# Patient Record
Sex: MALE | Race: WHITE
[De-identification: names, ages, dates, MRNs, and addresses within clinical notes are randomized per-mention and may not be internally consistent; named-entity substitution may affect disease eponyms.]

---

## 2020-07-20 ENCOUNTER — HOSPITAL ENCOUNTER (EMERGENCY)
Dept: HOSPITAL 76 - ED | Age: 20
Discharge: HOME | End: 2020-07-20
Payer: COMMERCIAL

## 2020-07-20 VITALS — SYSTOLIC BLOOD PRESSURE: 117 MMHG | DIASTOLIC BLOOD PRESSURE: 70 MMHG

## 2020-07-20 DIAGNOSIS — T17.220A: Primary | ICD-10-CM
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
## 2020-07-20 PROCEDURE — 99283 EMERGENCY DEPT VISIT LOW MDM: CPT

## 2020-07-20 PROCEDURE — 71045 X-RAY EXAM CHEST 1 VIEW: CPT

## 2020-07-20 PROCEDURE — 99284 EMERGENCY DEPT VISIT MOD MDM: CPT

## 2020-07-20 NOTE — ED PHYSICIAN DOCUMENTATION
PD HPI HEENT





- Stated complaint


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





- Chief complaint


Chief Complaint: Heent





- History obtained from


History obtained from: Patient





- History of Present Illness


Timing - onset: How many hours ago (1)


Timing - duration: Minutes


Timing - details: Abrupt onset


Location: Throat (He was sucking on a small hard candy about the size of a dime 

or nickel and felt that it gets stuck in his throat.  He states he abruptly 

could not breathe or talk and felt that he was choking for about 20 or 30 

seconds.  He then was able to take it in bit of a breath in and felt the candy 

go down.), Other (He continues with some soreness in the throat but was having 

normal breathing and voice.  He had not tried to swallow any fluids since the 

event.  He was brought here by his significant other directly after the event.  

He felt coughing for a few minutes afterwards but not now)


Worsens: Swalllowing


Associated symptoms: No: Congestion, Rhinorrhea, Swollen nodes, Facial swelling,

Cough


Similar symptoms before: Has not had sx before





Review of Systems


Cardiac: denies: Chest pain / pressure


Respiratory: denies: Dyspnea, Wheezing


GI: denies: Nausea, Vomiting


Neurologic: denies: Near syncope, Syncope, Altered mental status





PD PAST MEDICAL HISTORY





- Past Medical History


Past Medical History: No


Cardiovascular: None


Respiratory: None





- Allergies


Allergies/Adverse Reactions: 


                                    Allergies











Allergy/AdvReac Type Severity Reaction Status Date / Time


 


No Known Drug Allergies Allergy   Verified 07/20/20 21:43














PD ED PE NORMAL





- Vitals


Vital signs reviewed: Yes





- General


General: Alert and oriented X 3, No acute distress, Well developed/nourished





- HEENT


HEENT: Moist mucous membranes, Pharynx benign





- Neck


Neck: Supple, no meningeal sign, No adenopathy





- Cardiac


Cardiac: RRR, No murmur





- Respiratory


Respiratory: Clear bilaterally (Symmetric breath sounds and no wheezing)





- Abdomen


Abdomen: Soft, Non tender





- Derm


Derm: Normal color, Warm and dry





- Neuro


Neuro: Alert and oriented X 3, Normal speech





Results





- Vitals


Vitals: 


                               Vital Signs - 24 hr











  07/20/20 07/20/20





  21:43 22:54


 


Temperature 36.5 C 36.5 C


 


Heart Rate 62 58 L


 


Respiratory 16 14





Rate  


 


Blood Pressure 135/64 H 117/70


 


O2 Saturation 100 98








                                     Oxygen











O2 Source                      Room air

















- Rads (name of study)


  ** chest xray


Radiology: Prelim report reviewed (symmetric lung fields), See rad report





PD MEDICAL DECISION MAKING





- ED course


Complexity details: reviewed results (He swallowed easily some water here.  

Still breathing and voice are normal.  Chest x-ray is symmetric.), considered 

differential (Currently there is no stridor or wheezing altered voice.  He had 

not tried swallowing as yet so we will give him some water as well.  Can get a 

chest x-ray to ensure no asymmetry of the lung fields but it sounds that he 

swallowed it after choking and did not go down the air passageway), d/w patient





Departure





- Departure


Disposition: 01 Home, Self Care


Clinical Impression: 


 Choking episode





Condition: Stable


Record reviewed to determine appropriate education?: Yes


Instructions:  ED Choking Spell


Follow-Up: 


LAUR[facility] [place] [Provider Group]


Comments: 


Liquids or soft food for a day or 2 as there is likely going to be a little 

irritation in the upper esophagus and throat.  Tylenol or ibuprofen if needed fo

r discomfort.  You should feel better within a day or 2 completely with 

swallowing etc.  Return if problems.


Discharge Date/Time: 07/20/20 22:54

## 2020-07-21 NOTE — XRAY REPORT
PROCEDURE:  Chest 1 View X-Ray

 

INDICATIONS:  brief choking episode

 

TECHNIQUE:  One view of the chest was acquired.  

 

COMPARISON:  None.

 

FINDINGS:  

 

Surgical changes and devices:  None.  

 

Lungs and pleura:  No pleural effusions or pneumothorax.  Lungs are clear.  

 

Mediastinum:  Mediastinal contours appear normal.  Heart size is normal.  

 

Bones and chest wall:  No suspicious bony lesions.  Overlying soft tissues appear unremarkable.  

 

IMPRESSION:  

 

1. No acute cardiopulmonary disease.

 

Reviewed by: Kevin López MD on 7/21/2020 10:03 AM PDT

Approved by: Kevin López MD on 7/21/2020 10:03 AM PDT

 

 

Station ID:  [id number]

## 2021-12-29 ENCOUNTER — HOSPITAL ENCOUNTER (EMERGENCY)
Dept: HOSPITAL 76 - ED | Age: 21
Discharge: HOME | End: 2021-12-29
Payer: COMMERCIAL

## 2021-12-29 VITALS — DIASTOLIC BLOOD PRESSURE: 85 MMHG | SYSTOLIC BLOOD PRESSURE: 149 MMHG

## 2021-12-29 DIAGNOSIS — S34.01XA: Primary | ICD-10-CM
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
## 2021-12-29 PROCEDURE — 99283 EMERGENCY DEPT VISIT LOW MDM: CPT

## 2021-12-29 PROCEDURE — 99282 EMERGENCY DEPT VISIT SF MDM: CPT

## 2021-12-29 NOTE — ED PHYSICIAN DOCUMENTATION
PD HPI BACK PAIN





- Stated complaint


Stated Complaint: BACK INJURY





- Chief complaint


Chief Complaint: Back Pain





- History obtained from


History obtained from: Patient





- Additional information


Additional information: 





At 9 AM he was walking down the stairs and slipped and fell and hit his back on 

the stair and now has severe low back pain.  He declines pain medication for 

this, already took Tylenol at home and he is allergic to NSAIDs.





Review of Systems


Constitutional: reports: Reviewed and negative


Eyes: reports: Reviewed and negative


Ears: reports: Reviewed and negative


Nose: reports: Reviewed and negative





PD PAST MEDICAL HISTORY





- Past Medical History


Cardiovascular: None


Respiratory: None





- Past Surgical History


Past Surgical History: Yes


Derm: Other





- Present Medications


Home Medications: 


                                Ambulatory Orders











 Medication  Instructions  Recorded  Confirmed


 


No Known Home Medications  12/29/21 12/29/21














- Allergies


Allergies/Adverse Reactions: 


                                    Allergies











Allergy/AdvReac Type Severity Reaction Status Date / Time


 


NSAIDS (Non-Steroidal Allergy  Emesis Verified 12/29/21 12:06





Anti-Inflamma     














- Social History


Does the pt smoke?: No


Smoking Status: Never smoker


Does the pt drink ETOH?: No


Does the pt have substance abuse?: No





- Immunizations


Immunizations are current?: Yes





- POLST


Patient has POLST: No





PD ED PE NORMAL





- Vitals


Vital signs reviewed: Yes





- General


General: Alert and oriented X 3, No acute distress





- Neck


Neck: Supple, no meningeal sign, No bony TTP, C-Spine cleared by NEXUS criteria





- Back


Back: Other (Some diffuse tenderness of the lumbar spine, not the sacrum.)





- Neuro


Neuro: Alert and oriented X 3, Normal speech





Results





- Vitals


Vitals: 


                               Vital Signs - 24 hr











  12/29/21





  12:04


 


Temperature 36.4 C L


 


Heart Rate 71


 


Respiratory 16





Rate 


 


Blood Pressure 149/85 H


 


O2 Saturation 99








                                     Oxygen











O2 Source                      Room air

















- Rads (name of study)


  ** X-ray of the lumbar spine showed some degenerative she changes but no 

trauma


Radiology: EMP read contemporaneously





Departure





- Departure


Disposition: 01 Home, Self Care


Clinical Impression: 


 Concussion of lumbar spinal cord





Condition: Good


Record reviewed to determine appropriate education?: Yes


Instructions:  ED Low Back Pain Injury


Comments: 


Tylenol as needed for pain, return for new or worsening symptoms.  Follow-up 

with your doctor in a week if not better.


Discharge Date/Time: 12/29/21 13:28

## 2022-02-07 ENCOUNTER — HOSPITAL ENCOUNTER (EMERGENCY)
Dept: HOSPITAL 76 - ED | Age: 22
Discharge: HOME | End: 2022-02-07
Payer: COMMERCIAL

## 2022-02-07 VITALS — SYSTOLIC BLOOD PRESSURE: 124 MMHG | DIASTOLIC BLOOD PRESSURE: 81 MMHG
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
## 2022-02-07 DIAGNOSIS — S39.012A: Primary | ICD-10-CM

## 2022-02-07 PROCEDURE — 99283 EMERGENCY DEPT VISIT LOW MDM: CPT

## 2022-02-07 PROCEDURE — 72170 X-RAY EXAM OF PELVIS: CPT

## 2022-02-07 PROCEDURE — 96372 THER/PROPH/DIAG INJ SC/IM: CPT

## 2022-02-07 NOTE — XRAY REPORT
PROCEDURE:  Pelvis 1 View

 

INDICATIONS:  pelvic pain after injury

 

TECHNIQUE:  2 view(s) of the pelvis acquired.  

 

COMPARISON:  None.

 

FINDINGS:  

 

Bones:  Examination of right iliac bone and sacrum is limited due to bowel buckle obscuring this americo
on. No fractures or dislocations.  No suspicious bony lesions.  

 

Soft tissues:  Visualized bowel gas pattern is normal.  No suspicious soft tissue calcifications.  

 

IMPRESSION: Slightly limited study as above. No gross acute pelvic fracture or dislocation is seen.

 

Reviewed by: Mark Anthoyn Blackburn MD on 2/7/2022 4:48 PM PST

Approved by: Mark Anthony Blackburn MD on 2/7/2022 4:48 PM PST

 

 

Station ID:  [id number]

## 2022-02-07 NOTE — ED PHYSICIAN DOCUMENTATION
History of Present Illness





- Stated complaint


Stated Complaint: BACK INJ





- Chief complaint


Chief Complaint: Back Pain





- History obtained from


History obtained from: Patient





- Additonal information


Additional information: 


Pt comes to the ED with CC of R low back pain after a loaded twisting injury.  

Pt states he was holding a chainsaw on the ground with while pulling on the cord

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 

"pop" in his R low back, toward his buttock.  This happened a couple of hours 

ago, and he is having a lot of pain in the area with certain movements.  No h/o 

back problems previously.  No numbness, weakness, or difficulty controlling 

bowels or bladder.  Pt is otherwise healthy.  








Review of Systems


Ten Systems: 10 systems reviewed and negative


Constitutional: reports: Reviewed and negative


Eyes: reports: Reviewed and negative


Ears: reports: Reviewed and negative


Nose: reports: Reviewed and negative


Throat: reports: Reviewed and negative


Cardiac: reports: Reviewed and negative


Respiratory: reports: Reviewed and negative


GI: reports: Reviewed and negative


: reports: Reviewed and negative


Skin: reports: Reviewed and negative


Musculoskeletal: reports: Back pain


Neurologic: reports: Reviewed and negative


Psychiatric: reports: Reviewed and negative


Endocrine: reports: Reviewed and negative


Immunocompromised: reports: Reviewed and negative





PD PAST MEDICAL HISTORY





- Past Medical History


Cardiovascular: None


Respiratory: None





- Past Surgical History


Past Surgical History: Yes


Derm: Other





- Present Medications


Home Medications: 


                                Ambulatory Orders











 Medication  Instructions  Recorded  Confirmed


 


Cyclobenzaprine [Flexeril] 10 mg PO TID PRN #20 tablet 02/07/22 


 


HYDROcod/ACETAM 5/325 [Norco 5/325] 1 - 2 tablet PO Q6H PRN #7 tablet 02/07/22 














- Allergies


Allergies/Adverse Reactions: 


                                    Allergies











Allergy/AdvReac Type Severity Reaction Status Date / Time


 


NSAIDS (Non-Steroidal Allergy  Emesis Verified 12/29/21 12:06





Anti-Inflamma     














- Social History


Does the pt smoke?: No


Smoking Status: Never smoker


Does the pt drink ETOH?: No


Does the pt have substance abuse?: No





- Immunizations


Immunizations are current?: Yes





- POLST


Patient has POLST: No





PD ED PE NORMAL





- Vitals


Vital signs reviewed: Yes





- General


General: Alert and oriented X 3, No acute distress, Well developed/nourished, 

Other (pt appears uncomfortable, laying flat on back.)





- HEENT


HEENT: Atraumatic, PERRL, EOMI, Moist mucous membranes





- Neck


Neck: Supple, no meningeal sign, No bony TTP





- Cardiac


Cardiac: RRR, No murmur





- Respiratory


Respiratory: No respiratory distress, Clear bilaterally





- Abdomen


Abdomen: Soft, Non tender, Non distended





- Back


Back: No spinal TTP, Other (focal tenderness over R lumbosacral musculature and 

SI joint.)





- Derm


Derm: Normal color, Warm and dry, No rash





- Extremities


Extremities: No deformity





- Neuro


Neuro: Alert and oriented X 3, No motor deficit, No sensory deficit





- Psych


Psych: Normal mood, Normal affect





Results





- Vitals


Vitals: 


                               Vital Signs - 24 hr











  02/07/22 02/07/22





  15:34 17:40


 


Temperature 36.9 C 


 


Heart Rate 88 76


 


Respiratory 18 18





Rate  


 


Blood Pressure 127/74 124/81 H


 


O2 Saturation 100 96








                                     Oxygen











O2 Source                      Room air

















- Rads (name of study)


  ** XR pelvis


Radiology: Final report received, EMP read indepedently, See rad report (nad)





PD MEDICAL DECISION MAKING





- ED course


Complexity details: reviewed results, re-evaluated patient, considered 

differential, d/w patient


ED course: 





Pt was treated symptomatically in the ED.  XR of the pelvic area was negative.  

We have discussed that this is likely a soft tissue injury, which will get 

better on its own in time.  We will treat symptomatically for now.  If sx show 

no improvement after 2-3 weeks, we have discussed that MRI through pt's PCP will

be the next step.





Departure





- Departure


Disposition: 01 Home, Self Care


Clinical Impression: 


Low back strain


Qualifiers:


 Encounter type: initial encounter Qualified Code(s): S39.012A - Strain of muscl

e, fascia and tendon of lower back, initial encounter





Condition: Stable


Instructions:  ED Sprain Strain Lumbar


Prescriptions: 


Cyclobenzaprine [Flexeril] 10 mg PO TID PRN #20 tablet


 PRN Reason: Spasms


HYDROcod/ACETAM 5/325 [Norco 5/325] 1 - 2 tablet PO Q6H PRN #7 tablet


 PRN Reason: Pain


Comments: 


Your x-rays look good.  Most likely, you have sustained a soft tissue strain of 

either one of the ligaments, tendons, or muscular bodies in the area where you 

are hurting.  The most important aspect of management is to avoid restraining 

the area.  You will need to probably have some degree of modified duty at work, 

though the degree of modification is dependent on your symptoms.  Anything heavy

enough to feel like you are straining the area and causing pain should be 

avoided.  Should be avoided until you are feeling better.  In the meantime, you 

may take the muscle relaxant and pain medication as needed.  Follow-up with your

primary care physician if you're not feeling better in the next few weeks and 

discuss whether MRI would be indicated at that point in time.





Your prescriptions have been electronically transmitted to [id number].


Discharge Date/Time: 02/07/22 18:05